# Patient Record
Sex: MALE | Race: WHITE
[De-identification: names, ages, dates, MRNs, and addresses within clinical notes are randomized per-mention and may not be internally consistent; named-entity substitution may affect disease eponyms.]

---

## 2019-07-02 ENCOUNTER — HOSPITAL ENCOUNTER (OUTPATIENT)
Dept: HOSPITAL 62 - LAB | Age: 69
End: 2019-07-02
Attending: PAIN MEDICINE
Payer: COMMERCIAL

## 2019-07-02 DIAGNOSIS — D68.9: Primary | ICD-10-CM

## 2019-07-02 LAB
ADD MANUAL DIFF: NO
APPEARANCE UR: (no result)
APTT BLD: 28.4 SEC (ref 23.5–35.8)
APTT PPP: (no result) S
BASOPHILS # BLD AUTO: 0 10^3/UL (ref 0–0.2)
BASOPHILS NFR BLD AUTO: 0.5 % (ref 0–2)
BILIRUB UR QL STRIP: NEGATIVE
EOSINOPHIL # BLD AUTO: 0.1 10^3/UL (ref 0–0.6)
EOSINOPHIL NFR BLD AUTO: 1.7 % (ref 0–6)
ERYTHROCYTE [DISTWIDTH] IN BLOOD BY AUTOMATED COUNT: 14.3 % (ref 11.5–14)
GLUCOSE UR STRIP-MCNC: NEGATIVE MG/DL
HCT VFR BLD CALC: 43 % (ref 37.9–51)
HGB BLD-MCNC: 14.7 G/DL (ref 13.5–17)
INR PPP: 0.97
KETONES UR STRIP-MCNC: NEGATIVE MG/DL
LYMPHOCYTES # BLD AUTO: 2.5 10^3/UL (ref 0.5–4.7)
LYMPHOCYTES NFR BLD AUTO: 28 % (ref 13–45)
MCH RBC QN AUTO: 30.5 PG (ref 27–33.4)
MCHC RBC AUTO-ENTMCNC: 34.2 G/DL (ref 32–36)
MCV RBC AUTO: 89 FL (ref 80–97)
MONOCYTES # BLD AUTO: 0.9 10^3/UL (ref 0.1–1.4)
MONOCYTES NFR BLD AUTO: 9.6 % (ref 3–13)
NEUTROPHILS # BLD AUTO: 5.3 10^3/UL (ref 1.7–8.2)
NEUTS SEG NFR BLD AUTO: 60.2 % (ref 42–78)
NITRITE UR QL STRIP: NEGATIVE
PH UR STRIP: 5 [PH] (ref 5–9)
PLATELET # BLD: 254 10^3/UL (ref 150–450)
PROT UR STRIP-MCNC: NEGATIVE MG/DL
PROTHROMBIN TIME: 12.9 SEC (ref 11.4–15.4)
RBC # BLD AUTO: 4.81 10^6/UL (ref 4.35–5.55)
SP GR UR STRIP: 1.02
TOTAL CELLS COUNTED % (AUTO): 100 %
UROBILINOGEN UR-MCNC: NEGATIVE MG/DL (ref ?–2)
WBC # BLD AUTO: 8.9 10^3/UL (ref 4–10.5)

## 2019-07-02 PROCEDURE — 85730 THROMBOPLASTIN TIME PARTIAL: CPT

## 2019-07-02 PROCEDURE — 85025 COMPLETE CBC W/AUTO DIFF WBC: CPT

## 2019-07-02 PROCEDURE — 85610 PROTHROMBIN TIME: CPT

## 2019-07-02 PROCEDURE — 36415 COLL VENOUS BLD VENIPUNCTURE: CPT

## 2019-07-02 PROCEDURE — 81001 URINALYSIS AUTO W/SCOPE: CPT

## 2019-08-14 LAB
APPEARANCE UR: CLEAR
APTT BLD: 32.2 SEC (ref 23.5–35.8)
APTT PPP: (no result) S
BILIRUB UR QL STRIP: NEGATIVE
ERYTHROCYTE [DISTWIDTH] IN BLOOD BY AUTOMATED COUNT: 13.7 % (ref 11.5–14)
GLUCOSE UR STRIP-MCNC: NEGATIVE MG/DL
HCT VFR BLD CALC: 43.7 % (ref 37.9–51)
HGB BLD-MCNC: 14.9 G/DL (ref 13.5–17)
INR PPP: 0.93
KETONES UR STRIP-MCNC: NEGATIVE MG/DL
MCH RBC QN AUTO: 30.4 PG (ref 27–33.4)
MCHC RBC AUTO-ENTMCNC: 34.1 G/DL (ref 32–36)
MCV RBC AUTO: 89 FL (ref 80–97)
NITRITE UR QL STRIP: NEGATIVE
PH UR STRIP: 6 [PH] (ref 5–9)
PLATELET # BLD: 204 10^3/UL (ref 150–450)
PROT UR STRIP-MCNC: NEGATIVE MG/DL
PROTHROMBIN TIME: 12.5 SEC (ref 11.4–15.4)
RBC # BLD AUTO: 4.9 10^6/UL (ref 4.35–5.55)
SP GR UR STRIP: 1.01
UROBILINOGEN UR-MCNC: NEGATIVE MG/DL (ref ?–2)
WBC # BLD AUTO: 5 10^3/UL (ref 4–10.5)

## 2019-08-14 NOTE — RADIOLOGY REPORT (SQ)
EXAM DESCRIPTION:  CHEST PA/LATERAL



COMPLETED DATE/TIME:  8/14/2019 9:42 am



REASON FOR STUDY:  PRE-OP



COMPARISON:  None.



EXAM PARAMETERS:  NUMBER OF VIEWS: two views

TECHNIQUE: Digital Frontal and Lateral radiographic views of the chest acquired.

RADIATION DOSE: NA

LIMITATIONS: none



FINDINGS:  LUNGS AND PLEURA: Minimal linear left basilar opacities, likely atelectasis or scarring.  
No other airspace disease.  No pleural effusion or pneumothorax.  Increased AP diameter of the thorax
.

MEDIASTINUM AND HILAR STRUCTURES: No masses or contour abnormalities.

HEART AND VASCULAR STRUCTURES: Normal heart size.  Aortic atherosclerosis.

BONES: No acute findings.

HARDWARE: None in the chest.

OTHER: No other significant finding.



IMPRESSION:  Minimal linear left basilar opacities, likely atelectasis or scarring.  No other evidenc
e of acute cardiopulmonary process.



TECHNICAL DOCUMENTATION:  JOB ID:  [id number]

 2011 [employer]- All Rights Reserved



Reading location - IP/workstation name: [email or web address]

## 2019-08-20 ENCOUNTER — HOSPITAL ENCOUNTER (OUTPATIENT)
Dept: HOSPITAL 62 - OROUT | Age: 69
Discharge: HOME | End: 2019-08-20
Attending: PAIN MEDICINE
Payer: COMMERCIAL

## 2019-08-20 VITALS — SYSTOLIC BLOOD PRESSURE: 120 MMHG | DIASTOLIC BLOOD PRESSURE: 79 MMHG

## 2019-08-20 DIAGNOSIS — G89.4: ICD-10-CM

## 2019-08-20 DIAGNOSIS — K21.9: ICD-10-CM

## 2019-08-20 DIAGNOSIS — M96.1: ICD-10-CM

## 2019-08-20 DIAGNOSIS — M54.17: Primary | ICD-10-CM

## 2019-08-20 DIAGNOSIS — I10: ICD-10-CM

## 2019-08-20 DIAGNOSIS — Z79.899: ICD-10-CM

## 2019-08-20 PROCEDURE — 85730 THROMBOPLASTIN TIME PARTIAL: CPT

## 2019-08-20 PROCEDURE — 36415 COLL VENOUS BLD VENIPUNCTURE: CPT

## 2019-08-20 PROCEDURE — 63650 IMPLANT NEUROELECTRODES: CPT

## 2019-08-20 PROCEDURE — 71046 X-RAY EXAM CHEST 2 VIEWS: CPT

## 2019-08-20 PROCEDURE — 93010 ELECTROCARDIOGRAM REPORT: CPT

## 2019-08-20 PROCEDURE — 00400 ANES INTEGUMENTARY SYS NOS: CPT

## 2019-08-20 PROCEDURE — 85610 PROTHROMBIN TIME: CPT

## 2019-08-20 PROCEDURE — 84132 ASSAY OF SERUM POTASSIUM: CPT

## 2019-08-20 PROCEDURE — 63685 INS/RPLC SPI NPG/RCVR POCKET: CPT

## 2019-08-20 PROCEDURE — 93005 ELECTROCARDIOGRAM TRACING: CPT

## 2019-08-20 PROCEDURE — C1820 GENERATOR NEURO RECHG BAT SY: HCPCS

## 2019-08-20 PROCEDURE — 72080 X-RAY EXAM THORACOLMB 2/> VW: CPT

## 2019-08-20 PROCEDURE — 81001 URINALYSIS AUTO W/SCOPE: CPT

## 2019-08-20 PROCEDURE — 85027 COMPLETE CBC AUTOMATED: CPT

## 2019-08-20 PROCEDURE — C1778 LEAD, NEUROSTIMULATOR: HCPCS

## 2019-08-20 RX ADMIN — FENTANYL CITRATE ONE MCG: 50 INJECTION INTRAMUSCULAR; INTRAVENOUS at 11:22

## 2019-08-20 RX ADMIN — FENTANYL CITRATE ONE MCG: 50 INJECTION INTRAMUSCULAR; INTRAVENOUS at 11:16

## 2019-08-20 NOTE — RADIOLOGY REPORT (SQ)
EXAM DESCRIPTION:  THORACOLUMBAR SPINE AP/LAT; NO CHG FLUORO



COMPLETED DATE/TIME:  8/20/2019 1:54 pm



REASON FOR STUDY:  SPINAL STIMULATOR PLCMT ASST WITH FLUORO IN OR M54.17  RADICULOPATHY, LUMBOSACRAL 
REGION G89.4  CHRONIC PAIN SYNDROME



COMPARISON:  None.



FLUOROSCOPY TIME:  3.5 minutes

Spot images saved to PACS.



TECHNIQUE:  Intra-operative images acquired during surgical procedure to evaluate progress.

NUMBER OF IMAGES: 10



LIMITATIONS:  None.



FINDINGS:  Fluoroscopy was provided for intraoperative procedure.  Please refer to the operative repo
rt further discussion.



IMPRESSION:  IMAGE(S) OBTAINED DURING PROCEDURE.



COMMENT:  Quality :  Final reports for procedures using fluoroscopy that document radiation exp
osure indices, or exposure time and number of fluorographic images (if radiation exposure indices are
 not available)

Please consult full operative report of the attending physician for description of the procedure.



TECHNICAL DOCUMENTATION:  JOB ID:  [id number]

 2011 [employer]- All Rights Reserved



Reading location - IP/workstation name: [email or web address]

## 2019-08-20 NOTE — OPERATIVE REPORT E
Operative Report



NAME: JAMIA CABALLERO

MRN:  [id number]          : 1950 AGE:  69Y

DATE OF SURGERY: 2019              ROOM:





PREOPERATIVE DIAGNOSIS:

Post-laminectomy syndrome with chronic back and lower extremity pain and

lumbar radiculopathy.



POSTOPERATIVE DIAGNOSIS:

Post-laminectomy syndrome with chronic back and lower extremity pain and

lumbar radiculopathy.



OPERATIVE PROCEDURE:

1.  Implantation of right and left spinal cord stimulating electrodes

using [employer] octrodes.

2.  Implantation of pulse generator.

3.  Fluoroscopy for needle and lead placement.

4.  Programming and analysis for impedance testing.



PRINCIPAL SURGEON:

GIOVANNI MENDOZA M.D.



ASSISTANT:

BUTCH DONG M.D.



ANESTHESIA:

MAC.



BLOOD LOSS:

5 mL.



COMPLICATIONS:

None.



INDICATIONS:

Successful outpatient trial of spinal cord stimulation.



SPECIMENS REMOVED:

None.





PROCEDURE NOTE:

After obtaining informed consent advising the patient of the risks and

benefits, including serious neurological injury, bleeding, and infection,

allergic reaction, paralysis, and death, he was taken to the operating

room suite and placed comfortably in the prone position.  Comfort was

assessed visually and verbally.  MAC anesthesia was administered.  He was

prepped with chlorhexidine with appropriate drying time and then draped. 

Fluoroscopy was used to evaluate the spine and identify a suitable

entrance site for the epidural space as well as skin incision site.  The

pulse generator site over the right flank was identified preoperatively

and marked.  Both regions were infiltrated with 1% lidocaine with bicarb

followed by 0.25% bupivacaine with epinephrine.  Sharp and blunt

dissection were performed over both selected sites.  Electrocautery was

used for hemostasis as necessary.  Once a suitable working space was

created in the lumbar incision, a Anselmo retractor was placed.  The

paraspinal musculature was anesthetized with 1% lidocaine beginning on the

right followed by the left for placement of the Tuohy needle.  The right

needle was placed first at the T12-L1 interspace without difficulty using

loss-of-resistance-to-saline technique.  The lead was advanced into the

midline and up to approximately T12.  The lateral view was checked and

found to be satisfactory.  This was repeated on the left side.  Once both

leads were satisfactorily placed into the posterior location, the leads

were advanced to the middle of T8 in a slightly staggered position. 

Again, x-ray views were taken, and the AP and lateral views reassured

satisfactory placement.  Pursestrings were placed around each needle using

0 Mersilene followed by distal stay sutures.  Beginning on the right, the

introducer needle was removed, with care being taken not to dislodge the

electrode.  The pursestring was secured and the tubular anchor was placed

over the lead and secured to the pursestring as well as the fascia.  The

stay suture was secured as well.  Some very slight lead migration of the

distal lead occurred but did not need to be revised.  This was repeated on

the left.  Once both leads were secured the leads were tunneled to the

pulse generator pocket and connected to the pulse generator.  The

impedance was tested and found to be satisfactory with slight insertion

and reinsertion of the 8 through 15 electrode.  It should be noted that

the left lead was marked and placed in the 0 through 7 space on the pulse

generator.  All hex nuts were secured.  The wounds were copiously

irrigated with Betadine-containing irrigation solution.  The pulse

generator pocket was closed with vertical inverted mattress sutures using

2-0 Polysorb followed by Dermabond tape and Dermabond cement.  When this

was dried, Telfa and Tegaderm were placed.  The midline incision was

closed with 2 layers of 2-0 followed by 3-0 followed by staples and then

secured and sealed with the Dermabond cement and tape.  Again, Telfa and

Tegaderm were placed over these.  The patient was then taken to the PACU

for further postoperative care and monitoring.  The patient tolerated this

well.



DICTATING PHYSICIAN:  BUTCH DONG M.D.





[id number]                  DT: 2019    1119

PHY#: [id number]            DD: 2019    1057

ID:   [id number]           JOB#: [id number]       ACCT: [id number]



cc:BUTCH DONG M.D.

>

## 2019-08-20 NOTE — RADIOLOGY REPORT (SQ)
EXAM DESCRIPTION:  THORACOLUMBAR SPINE AP/LAT; NO CHG FLUORO



COMPLETED DATE/TIME:  8/20/2019 1:54 pm



REASON FOR STUDY:  SPINAL STIMULATOR PLCMT ASST WITH FLUORO IN OR M54.17  RADICULOPATHY, LUMBOSACRAL 
REGION G89.4  CHRONIC PAIN SYNDROME



COMPARISON:  None.



FLUOROSCOPY TIME:  3.5 minutes

Spot images saved to PACS.



TECHNIQUE:  Intra-operative images acquired during surgical procedure to evaluate progress.

NUMBER OF IMAGES: 10



LIMITATIONS:  None.



FINDINGS:  Fluoroscopy was provided for intraoperative procedure.  Please refer to the operative repo
rt further discussion.



IMPRESSION:  IMAGE(S) OBTAINED DURING PROCEDURE.



COMMENT:  Quality :  Final reports for procedures using fluoroscopy that document radiation exp
osure indices, or exposure time and number of fluorographic images (if radiation exposure indices are
 not available)

Please consult full operative report of the attending physician for description of the procedure.



TECHNICAL DOCUMENTATION:  JOB ID:  [id number]

 2011 [employer]- All Rights Reserved



Reading location - IP/workstation name: [email or web address]